# Patient Record
Sex: FEMALE | Race: WHITE | NOT HISPANIC OR LATINO | URBAN - METROPOLITAN AREA
[De-identification: names, ages, dates, MRNs, and addresses within clinical notes are randomized per-mention and may not be internally consistent; named-entity substitution may affect disease eponyms.]

---

## 2017-01-16 ENCOUNTER — IMPORTED ENCOUNTER (OUTPATIENT)
Dept: URBAN - METROPOLITAN AREA CLINIC 50 | Facility: CLINIC | Age: 82
End: 2017-01-16

## 2017-02-06 ENCOUNTER — IMPORTED ENCOUNTER (OUTPATIENT)
Dept: URBAN - METROPOLITAN AREA CLINIC 50 | Facility: CLINIC | Age: 82
End: 2017-02-06

## 2021-05-14 ASSESSMENT — VISUAL ACUITY
OD_OTHER: 20/30. 20/40.
OD_CC: 20/30Â±
OS_CC: 20/25-
OD_BAT: 20/30
OS_OTHER: 20/25. 20/30.
OS_CC: J1+
OD_CC: J1+
OS_BAT: 20/25

## 2021-05-14 ASSESSMENT — TONOMETRY
OD_IOP_MMHG: 15
OS_IOP_MMHG: 13

## 2023-05-15 NOTE — PATIENT DISCUSSION
"""Follow ERM w/o surgery. Call if vision decreases or distortion increases. Recommend regular Amsler checks.  """ Yes